# Patient Record
Sex: MALE | URBAN - METROPOLITAN AREA
[De-identification: names, ages, dates, MRNs, and addresses within clinical notes are randomized per-mention and may not be internally consistent; named-entity substitution may affect disease eponyms.]

---
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
## 2023-07-19 ENCOUNTER — APPOINTMENT (RX ONLY)
Dept: URBAN - METROPOLITAN AREA CLINIC 164 | Facility: CLINIC | Age: 23
Setting detail: DERMATOLOGY
End: 2023-07-19

## 2023-07-19 VITALS — WEIGHT: 161 LBS | HEIGHT: 67 IN

## 2023-07-19 DIAGNOSIS — L70.0 ACNE VULGARIS: ICD-10-CM | Status: INADEQUATELY CONTROLLED

## 2023-07-19 DIAGNOSIS — Z41.9 ENCOUNTER FOR PROCEDURE FOR PURPOSES OTHER THAN REMEDYING HEALTH STATE, UNSPECIFIED: ICD-10-CM

## 2023-07-19 PROCEDURE — ? COSMETIC CONSULTATION: HYDRAFACIAL

## 2023-07-19 PROCEDURE — ? COSMETIC CONSULTATION: CHEMICAL PEELS

## 2023-07-19 PROCEDURE — ? ADDITIONAL NOTES

## 2023-07-19 PROCEDURE — ? COUNSELING

## 2023-07-19 PROCEDURE — ? COSMETIC QUOTE

## 2023-07-19 PROCEDURE — ? PRODUCT LINE (ZO SKIN HEALTH)

## 2023-07-19 PROCEDURE — ? PRESCRIPTION

## 2023-07-19 PROCEDURE — ? COSMETIC CONSULTATION - MICRO-NEEDLING

## 2023-07-19 PROCEDURE — 99214 OFFICE O/P EST MOD 30 MIN: CPT

## 2023-07-19 RX ORDER — CLINDAMYCIN PHOSPHATE AND TRETINOIN 12; .25 MG/G; MG/G
GEL TOPICAL
Qty: 60 | Refills: 3 | Status: ERX | COMMUNITY
Start: 2023-07-19

## 2023-07-19 RX ADMIN — CLINDAMYCIN PHOSPHATE AND TRETINOIN: 12; .25 GEL TOPICAL at 00:00

## 2023-07-19 ASSESSMENT — LOCATION ZONE DERM: LOCATION ZONE: FACE

## 2023-07-19 ASSESSMENT — LOCATION SIMPLE DESCRIPTION DERM: LOCATION SIMPLE: LEFT CHEEK

## 2023-07-19 ASSESSMENT — LOCATION DETAILED DESCRIPTION DERM: LOCATION DETAILED: LEFT CENTRAL MALAR CHEEK

## 2023-07-19 NOTE — HPI: SECONDARY COMPLAINT
How Severe Is This Condition?: mild
Additional History: It pops up in a specific pattern once a month

## 2023-07-19 NOTE — PROCEDURE: COSMETIC CONSULTATION - MICRO-NEEDLING
Price (Use Numbers Only, No Special Characters Or $): 236 Consultation Charge $ (Use Numbers Only, No Text Please.): 779

## 2023-07-19 NOTE — PROCEDURE: COSMETIC CONSULTATION: CHEMICAL PEELS
Consultation Charge $ (Use Numbers Only, No Special Characters Or $): 533 Price (Use Numbers Only, No Special Characters Or $): 389

## 2023-07-19 NOTE — HPI: PIMPLES (ACNE)
What Type Of Note Output Would You Prefer (Optional)?: Bullet Format
How Severe Is Your Acne?: mild
Is This A New Presentation, Or A Follow-Up?: Acne
Additional Comments (Use Complete Sentences): Pt just had a consult with  and pt will get Hydrafacial done in August.

## 2023-07-19 NOTE — PROCEDURE: COUNSELING
Sarecycline Counseling: Patient advised regarding possible photosensitivity and discoloration of the teeth, skin, lips, tongue and gums.  Patient instructed to avoid sunlight, if possible.  When exposed to sunlight, patients should wear protective clothing, sunglasses, and sunscreen.  The patient was instructed to call the office immediately if the following severe adverse effects occur:  hearing changes, easy bruising/bleeding, severe headache, or vision changes.  The patient verbalized understanding of the proper use and possible adverse effects of sarecycline.  All of the patient's questions and concerns were addressed.
gaby
Azelaic Acid Counseling: Patient counseled that medicine may cause skin irritation and to avoid applying near the eyes.  In the event of skin irritation, the patient was advised to reduce the amount of the drug applied or use it less frequently.   The patient verbalized understanding of the proper use and possible adverse effects of azelaic acid.  All of the patient's questions and concerns were addressed.
Spironolactone Counseling: Patient advised regarding risks of diarrhea, abdominal pain, hyperkalemia, birth defects (for female patients), liver toxicity and renal toxicity. The patient may need blood work to monitor liver and kidney function and potassium levels while on therapy. The patient verbalized understanding of the proper use and possible adverse effects of spironolactone.  All of the patient's questions and concerns were addressed.
High Dose Vitamin A Counseling: Side effects reviewed, pt to contact office should one occur.
Tetracycline Counseling: Patient counseled regarding possible photosensitivity and increased risk for sunburn.  Patient instructed to avoid sunlight, if possible.  When exposed to sunlight, patients should wear protective clothing, sunglasses, and sunscreen.  The patient was instructed to call the office immediately if the following severe adverse effects occur:  hearing changes, easy bruising/bleeding, severe headache, or vision changes.  The patient verbalized understanding of the proper use and possible adverse effects of tetracycline.  All of the patient's questions and concerns were addressed. Patient understands to avoid pregnancy while on therapy due to potential birth defects.
Topical Retinoid counseling:  Patient advised to apply a pea-sized amount only at bedtime and wait 30 minutes after washing their face before applying.  If too drying, patient may add a non-comedogenic moisturizer. The patient verbalized understanding of the proper use and possible adverse effects of retinoids.  All of the patient's questions and concerns were addressed.
Minocycline Counseling: Patient advised regarding possible photosensitivity and discoloration of the teeth, skin, lips, tongue and gums.  Patient instructed to avoid sunlight, if possible.  When exposed to sunlight, patients should wear protective clothing, sunglasses, and sunscreen.  The patient was instructed to call the office immediately if the following severe adverse effects occur:  hearing changes, easy bruising/bleeding, severe headache, or vision changes.  The patient verbalized understanding of the proper use and possible adverse effects of minocycline.  All of the patient's questions and concerns were addressed.
Aklief counseling:  Patient advised to apply a pea-sized amount only at bedtime and wait 30 minutes after washing their face before applying.  If too drying, patient may add a non-comedogenic moisturizer.  The most commonly reported side effects including irritation, redness, scaling, dryness, stinging, burning, itching, and increased risk of sunburn.  The patient verbalized understanding of the proper use and possible adverse effects of retinoids.  All of the patient's questions and concerns were addressed.
Tazorac Counseling:  Patient advised that medication is irritating and drying.  Patient may need to apply sparingly and wash off after an hour before eventually leaving it on overnight.  The patient verbalized understanding of the proper use and possible adverse effects of tazorac.  All of the patient's questions and concerns were addressed.
Benzoyl Peroxide Counseling: Patient counseled that medicine may cause skin irritation and bleach clothing.  In the event of skin irritation, the patient was advised to reduce the amount of the drug applied or use it less frequently.   The patient verbalized understanding of the proper use and possible adverse effects of benzoyl peroxide.  All of the patient's questions and concerns were addressed.
Topical Sulfur Applications Counseling: Topical Sulfur Counseling: Patient counseled that this medication may cause skin irritation or allergic reactions.  In the event of skin irritation, the patient was advised to reduce the amount of the drug applied or use it less frequently.   The patient verbalized understanding of the proper use and possible adverse effects of topical sulfur application.  All of the patient's questions and concerns were addressed.
Winlevi Pregnancy And Lactation Text: This medication is considered safe during pregnancy and breastfeeding.
Azithromycin Counseling:  I discussed with the patient the risks of azithromycin including but not limited to GI upset, allergic reaction, drug rash, diarrhea, and yeast infections.
Bactrim Counseling:  I discussed with the patient the risks of sulfa antibiotics including but not limited to GI upset, allergic reaction, drug rash, diarrhea, dizziness, photosensitivity, and yeast infections.  Rarely, more serious reactions can occur including but not limited to aplastic anemia, agranulocytosis, methemoglobinemia, blood dyscrasias, liver or kidney failure, lung infiltrates or desquamative/blistering drug rashes.
Birth Control Pills Counseling: Birth Control Pill Counseling: I discussed with the patient the potential side effects of OCPs including but not limited to increased risk of stroke, heart attack, thrombophlebitis, deep venous thrombosis, hepatic adenomas, breast changes, GI upset, headaches, and depression.  The patient verbalized understanding of the proper use and possible adverse effects of OCPs. All of the patient's questions and concerns were addressed.
Isotretinoin Counseling: Patient should get monthly blood tests, not donate blood, not drive at night if vision affected, not share medication, and not undergo elective surgery for 6 months after tx completed. Side effects reviewed, pt to contact office should one occur.
Dapsone Counseling: I discussed with the patient the risks of dapsone including but not limited to hemolytic anemia, agranulocytosis, rashes, methemoglobinemia, kidney failure, peripheral neuropathy, headaches, GI upset, and liver toxicity.  Patients who start dapsone require monitoring including baseline LFTs and weekly CBCs for the first month, then every month thereafter.  The patient verbalized understanding of the proper use and possible adverse effects of dapsone.  All of the patient's questions and concerns were addressed.
Doxycycline Counseling:  Patient counseled regarding possible photosensitivity and increased risk for sunburn.  Patient instructed to avoid sunlight, if possible.  When exposed to sunlight, patients should wear protective clothing, sunglasses, and sunscreen.  The patient was instructed to call the office immediately if the following severe adverse effects occur:  hearing changes, easy bruising/bleeding, severe headache, or vision changes.  The patient verbalized understanding of the proper use and possible adverse effects of doxycycline.  All of the patient's questions and concerns were addressed.
Erythromycin Counseling:  I discussed with the patient the risks of erythromycin including but not limited to GI upset, allergic reaction, drug rash, diarrhea, increase in liver enzymes, and yeast infections.
Sarecycline Pregnancy And Lactation Text: This medication is Pregnancy Category D and not consider safe during pregnancy. It is also excreted in breast milk.
Isotretinoin Pregnancy And Lactation Text: This medication is Pregnancy Category X and is considered extremely dangerous during pregnancy. It is unknown if it is excreted in breast milk.
Spironolactone Pregnancy And Lactation Text: This medication can cause feminization of the male fetus and should be avoided during pregnancy. The active metabolite is also found in breast milk.
Benzoyl Peroxide Pregnancy And Lactation Text: This medication is Pregnancy Category C. It is unknown if benzoyl peroxide is excreted in breast milk.
Detail Level: Zone
Aklief Pregnancy And Lactation Text: It is unknown if this medication is safe to use during pregnancy.  It is unknown if this medication is excreted in breast milk.  Breastfeeding women should use the topical cream on the smallest area of the skin for the shortest time needed while breastfeeding.  Do not apply to nipple and areola.
Tazorac Pregnancy And Lactation Text: This medication is not safe during pregnancy. It is unknown if this medication is excreted in breast milk.
Azelaic Acid Pregnancy And Lactation Text: This medication is considered safe during pregnancy and breast feeding.
Topical Sulfur Applications Pregnancy And Lactation Text: This medication is Pregnancy Category C and has an unknown safety profile during pregnancy. It is unknown if this topical medication is excreted in breast milk.
Topical Clindamycin Pregnancy And Lactation Text: This medication is Pregnancy Category B and is considered safe during pregnancy. It is unknown if it is excreted in breast milk.
Topical Retinoid Pregnancy And Lactation Text: This medication is Pregnancy Category C. It is unknown if this medication is excreted in breast milk.
Winlevi Counseling:  I discussed with the patient the risks of topical clascoterone including but not limited to erythema, scaling, itching, and stinging. Patient voiced their understanding.
Topical Clindamycin Counseling: Patient counseled that this medication may cause skin irritation or allergic reactions.  In the event of skin irritation, the patient was advised to reduce the amount of the drug applied or use it less frequently.   The patient verbalized understanding of the proper use and possible adverse effects of clindamycin.  All of the patient's questions and concerns were addressed.
Azithromycin Pregnancy And Lactation Text: This medication is considered safe during pregnancy and is also secreted in breast milk.
Bactrim Pregnancy And Lactation Text: This medication is Pregnancy Category D and is known to cause fetal risk.  It is also excreted in breast milk.
Birth Control Pills Pregnancy And Lactation Text: This medication should be avoided if pregnant and for the first 30 days post-partum.
Dapsone Pregnancy And Lactation Text: This medication is Pregnancy Category C and is not considered safe during pregnancy or breast feeding.
Include Pregnancy/Lactation Warning?: No
High Dose Vitamin A Pregnancy And Lactation Text: High dose vitamin A therapy is contraindicated during pregnancy and breast feeding.
Doxycycline Pregnancy And Lactation Text: This medication is Pregnancy Category D and not consider safe during pregnancy. It is also excreted in breast milk but is considered safe for shorter treatment courses.
Erythromycin Pregnancy And Lactation Text: This medication is Pregnancy Category B and is considered safe during pregnancy. It is also excreted in breast milk.

## 2023-07-19 NOTE — PROCEDURE: ADDITIONAL NOTES
Additional Notes: Patient came in for a cosmetic consult. Patients concerns are acne & acne scarring. I recommended maintenance HydraFacials to keep the skin decongested & I also suggested chemical peels (Purify w/ Precision Plus). Patients lifestyle does have some conflict to the chemical peels but can be adjusted. Patient also saw Lynnette Miller proscribed him a topical spot treatment for the current breaking out. I also educated the importance of a skin care regimen AM & PM to cleanse the skin properly. I recommended ZO Gentle Cleanser & Renewal Creme, but did hand patient a sample of the gentle cleanser. \\n\\nPatient was pleased with consult & is scheduled on August 7th at 8 AM for his first HydraFacial.\\n\\nPatient left with consult literature.
Render Risk Assessment In Note?: no
Detail Level: Zone

## 2023-07-19 NOTE — PROCEDURE: COSMETIC CONSULTATION: HYDRAFACIAL
Price (Use Numbers Only, No Special Characters Or $): 515 Price (Use Numbers Only, No Special Characters Or $): 684

## 2023-07-19 NOTE — PROCEDURE: PRODUCT LINE (ZO SKIN HEALTH)
Product 36 Units: 0
Product 7 Application Directions: Apply in the PM, include the neck. Can be applied in the AM if dry.
Product 24 Price (In Dollars - Numeric Only, No Special Characters Or $): 130
Name Of Product 13: Rozatrol
Product 39 Price (In Dollars - Numeric Only, No Special Characters Or $): 0.00
Product 18 Application Directions: Apply 2x a week at night for week 0-2.\\nApply 3x a week at night for week 2-4.\\nApply 4x a week at night for week 4-6.
Product 10 Price (In Dollars - Numeric Only, No Special Characters Or $): 47
Name Of Product 16: Skin Brightening Program
Name Of Product 2: Exfoliating Polish
Name Of Product 5: Complexion Renewal Pads
Name Of Product 8: Growth Factor Serum
Product 13 Price (In Dollars - Numeric Only, No Special Characters Or $): 91
Name Of Product 19: Exfoliating Polish (travel)
Product 24 Application Directions: Apply AM & PM after cleansing & toner.
Product 2 Price (In Dollars - Numeric Only, No Special Characters Or $): 68
Product 5 Price (In Dollars - Numeric Only, No Special Characters Or $): 53
Risk Of Complication Category: No MDM
Name Of Product 22: Retinol Skin Brightener 0.1 Retinol
Product 16 Price (In Dollars - Numeric Only, No Special Characters Or $): 193
Product 19 Price (In Dollars - Numeric Only, No Special Characters Or $): 21
Product 10 Application Directions: Cleanse AM & PM 7x a week.
Name Of Product 25: Brightening Eye Creme
Allow Plan To Count Towards E/M Coding: Yes
Product 13 Application Directions: Apply AM & PM before moisturizer.
Product 2 Application Directions: Use AM or PM 2-3x a week.
Product 8 Price (In Dollars - Numeric Only, No Special Characters Or $): 155
Name Of Product 11: Oil Control Pads
Product 5 Application Directions: Apply after cleansing in the AM & PM 7x a week.
Product 16 Application Directions: Written directions were handed to patient.
Name Of Product 14: Dual Action Scrub
Product 19 Application Directions: Exfoliate 2-3x a week.
Name Of Product 3: Calming Toner
Name Of Product 6: Hydrating Cleanser
Product 22 Application Directions: Written instructions were handed to the patient.
Product 11 Price (In Dollars - Numeric Only, No Special Characters Or $): 64
Name Of Product 20: Instant Pore Refiner
Name Of Product 17: Sunscreen + Primer SPF 30
Product 14 Price (In Dollars - Numeric Only, No Special Characters Or $): 80
Product 8 Application Directions: Apply AM & PM 7x a week.
Product 25 Application Directions: Apply AM & PM on the lower lids only, after cleansing.
Product 3 Price (In Dollars - Numeric Only, No Special Characters Or $): 40
Name Of Product 23: Grown Factor Eye Serum
Product 17 Price (In Dollars - Numeric Only, No Special Characters Or $): 67
Product 20 Price (In Dollars - Numeric Only, No Special Characters Or $): 62
Name Of Product 9: Daily Power Defense
Product 14 Application Directions: Scrub in the PM 3-5x a week.
Product 3 Application Directions: Apply AM & PM 7x a week after cleansing or exfoliating (on days you exfoliate).
Product 6 Application Directions: Use AM & PM 7x a week
Name Of Product 12: Acne Control
Detail Level: Zone
Product 9 Price (In Dollars - Numeric Only, No Special Characters Or $): 160
Product 17 Application Directions: Apply AM & reapply if prolong in the sun, every 2 hours.
Name Of Product 15: Wrinkle + Texture Repair
Name Of Product 4: Renewal Creme
Name Of Product 1: Gentle Cleanser
Name Of Product 7: Recovery Creme
Product 12 Price (In Dollars - Numeric Only, No Special Characters Or $): 36
Name Of Product 18: ZO Skin Brightener 0.5 Retinol
Product 23 Application Directions: Apply to upper & lower lids after cleansing.
Product 15 Price (In Dollars - Numeric Only, No Special Characters Or $): 154
Product 4 Price (In Dollars - Numeric Only, No Special Characters Or $): 115
Name Of Product 21: Retinol Skin Brightener 0.25
Product 7 Price (In Dollars - Numeric Only, No Special Characters Or $): 120
Assigning Risk Information: Per AMA, level of risk is based upon consequences of the problem(s) addressed at the encounter when appropriately treated. Risk also includes medical decision making related to the need to initiate or forego further testing, treatment and/or hospitalization. Over the counter medication are assigned a risk level of low. Prescription medication management is assigned a risk level of moderate.
Name Of Product 24: ZO BrightAlive Skin Brightener
Product 1 Units: 1
Product 12 Application Directions: Cleanse & Apply to cystic flare up 1-3x a day.
Product 21 Price (In Dollars - Numeric Only, No Special Characters Or $): 100
Name Of Product 10: Exfoliating Cleanser
Product 15 Application Directions: Apply 2x a week at night (0-2 weeks)\\nApply 3x a week at night (2-4 weeks)\\nApply 4x a week at night (4-6 weeks)

## 2023-07-19 NOTE — PROCEDURE: COSMETIC QUOTE
Implant 10 Units: 0
Injectable  19 Price/Unit (In Dollars- Use Only Numbers And Decimals): 0.00
Face Procedure 2: SkinPen
Face Procedure 5 Units: 1
Body Procedure 2 Price/Unit (In Dollars- Use Only Numbers And Decimals): 500
Face Procedure 9 Price/Unit (In Dollars- Use Only Numbers And Decimals): 429
Face Procedure 6 Price/Unit (In Dollars- Use Only Numbers And Decimals): 389
Face Procedure 3 Price/Unit (In Dollars- Use Only Numbers And Decimals): 265
Face Procedure 10: VI Peel (Purify)
Misc Procedure 1: HydraFacial Keravive
Include Sales Tax On Facility Fees: No
Detail Level: Zone
Face Procedure 7 Price/Unit (In Dollars- Use Only Numbers And Decimals): 199
Face Procedure 4 Price/Unit (In Dollars- Use Only Numbers And Decimals): 369
Body Procedure 1 Price/Unit (In Dollars- Use Only Numbers And Decimals): 299
Face Procedure 1 Price/Unit (In Dollars- Use Only Numbers And Decimals): 150
Face Procedure 8: ZO 3 Step Peel
Face Procedure 5: Vi Peel Purify w/ Precision Plus
Body Procedure 3: HydraFacial (Back)
Face Procedure 2 Price/Unit (In Dollars- Use Only Numbers And Decimals): 329
Face Procedure 9: SkinPen with Additional Firming Serum
Face Procedure 6: VI Peel Precision Plus
Body Procedure 2: VI Peel Body (large area)
Face Procedure 3: HydraFacial (Deluxe)
Include Sales Tax On Surgeon's Fees: Yes
Face Procedure 7: HydraFacial (Signature)
Misc Procedure 1 Price/Unit (In Dollars- Use Only Numbers And Decimals): 499
Face Procedure 4: Vi Peel (Advanced)
Body Procedure 1: VI Peel Body (small area)
Face Procedure 1: Milia extraction
Face Procedure 8 Price/Unit (In Dollars- Use Only Numbers And Decimals): 319

## 2023-08-07 ENCOUNTER — APPOINTMENT (RX ONLY)
Dept: URBAN - METROPOLITAN AREA CLINIC 164 | Facility: CLINIC | Age: 23
Setting detail: DERMATOLOGY
End: 2023-08-07

## 2023-08-07 DIAGNOSIS — Z41.9 ENCOUNTER FOR PROCEDURE FOR PURPOSES OTHER THAN REMEDYING HEALTH STATE, UNSPECIFIED: ICD-10-CM

## 2023-08-07 PROCEDURE — ? ADDITIONAL NOTES

## 2023-08-07 PROCEDURE — ? DELUXE HYDRAFACIAL

## 2023-08-07 NOTE — PROCEDURE: ADDITIONAL NOTES
Render Risk Assessment In Note?: no
Additional Notes: Patient came in for a HydraFacial deluxe. Patient was pleased with treatment and is scheduled on September 5 at 9 AM for his second HydraFacial.
Detail Level: Zone

## 2023-08-07 NOTE — PROCEDURE: DELUXE HYDRAFACIAL
Price (Use Numbers Only, No Special Characters Or $): 262 Price (Use Numbers Only, No Special Characters Or $): 690

## 2023-09-05 ENCOUNTER — APPOINTMENT (RX ONLY)
Dept: URBAN - METROPOLITAN AREA CLINIC 164 | Facility: CLINIC | Age: 23
Setting detail: DERMATOLOGY
End: 2023-09-05

## 2023-09-05 DIAGNOSIS — Z41.9 ENCOUNTER FOR PROCEDURE FOR PURPOSES OTHER THAN REMEDYING HEALTH STATE, UNSPECIFIED: ICD-10-CM

## 2023-09-05 PROCEDURE — ? ADDITIONAL NOTES

## 2023-09-05 PROCEDURE — ? DELUXE HYDRAFACIAL

## 2023-09-05 NOTE — PROCEDURE: DELUXE HYDRAFACIAL
Price (Use Numbers Only, No Special Characters Or $): 130 Price (Use Numbers Only, No Special Characters Or $): 112

## 2023-09-05 NOTE — PROCEDURE: ADDITIONAL NOTES
Detail Level: Zone
Render Risk Assessment In Note?: no
Additional Notes: Patient came in for a HydraFacial Deluxe. Patient was pleased with treatment & did state he has noticed a big change, but is still having issues around the temples. \\n\\nPatient is scheduled on October 3rd at 9 AM for a HydraFacial Deluxe and on November 14th at 8:30 AM for his first chemical peel (Purify w/ Precision Plus).\\n\\nPatient also purchased ZO Gentle cleanser.

## 2023-10-06 ENCOUNTER — APPOINTMENT (RX ONLY)
Dept: URBAN - METROPOLITAN AREA CLINIC 164 | Facility: CLINIC | Age: 23
Setting detail: DERMATOLOGY
End: 2023-10-06

## 2023-10-06 DIAGNOSIS — Z41.9 ENCOUNTER FOR PROCEDURE FOR PURPOSES OTHER THAN REMEDYING HEALTH STATE, UNSPECIFIED: ICD-10-CM

## 2023-10-06 PROCEDURE — ? DELUXE HYDRAFACIAL

## 2023-10-06 PROCEDURE — ? ADDITIONAL NOTES

## 2023-10-06 NOTE — PROCEDURE: DELUXE HYDRAFACIAL
Price (Use Numbers Only, No Special Characters Or $): 435 Price (Use Numbers Only, No Special Characters Or $): 266

## 2023-10-06 NOTE — PROCEDURE: ADDITIONAL NOTES
Render Risk Assessment In Note?: no
Detail Level: Zone
Additional Notes: Patient came in for a HydraFacial Deluxe. Patient has seen significant improvement & is very pleased. Problem areas are still the temples, but have improved. Radio frequency & clarifying booster was used today. \\n\\nPatient is scheduled for a 4th HydraFacial Deluxe on 11.7.23, 1 week prior to chemical peel.

## 2023-11-07 ENCOUNTER — APPOINTMENT (RX ONLY)
Dept: URBAN - METROPOLITAN AREA CLINIC 164 | Facility: CLINIC | Age: 23
Setting detail: DERMATOLOGY
End: 2023-11-07

## 2023-11-07 DIAGNOSIS — Z41.9 ENCOUNTER FOR PROCEDURE FOR PURPOSES OTHER THAN REMEDYING HEALTH STATE, UNSPECIFIED: ICD-10-CM

## 2023-11-07 PROCEDURE — ? DELUXE HYDRAFACIAL

## 2023-11-07 PROCEDURE — ? ADDITIONAL NOTES

## 2023-11-07 NOTE — PROCEDURE: ADDITIONAL NOTES
Additional Notes: Patient came in for a HydraFacial Deluxe. Patient was pleased with treatment & is scheduled on 11.14.23 at 8:30 for a CP & 12.12.23 at 8:00 for another HydraFacial Deluxe.
Render Risk Assessment In Note?: no
Detail Level: Zone

## 2023-11-14 ENCOUNTER — APPOINTMENT (RX ONLY)
Dept: URBAN - METROPOLITAN AREA CLINIC 164 | Facility: CLINIC | Age: 23
Setting detail: DERMATOLOGY
End: 2023-11-14

## 2023-11-14 DIAGNOSIS — Z41.9 ENCOUNTER FOR PROCEDURE FOR PURPOSES OTHER THAN REMEDYING HEALTH STATE, UNSPECIFIED: ICD-10-CM

## 2023-11-14 PROCEDURE — ? VI PEEL

## 2023-11-14 PROCEDURE — ? ADDITIONAL NOTES

## 2023-11-14 NOTE — PROCEDURE: ADDITIONAL NOTES
Detail Level: Zone
Additional Notes: Patient came in for a Vi Peel Purify w/ Precision Plus. Patient was pleased with treatment & is scheduled for a HydraFacial & post op on 12.12.23 at 8am.\\n\\nPatient left with post skin care & instructions.
Render Risk Assessment In Note?: no

## 2023-11-14 NOTE — PROCEDURE: VI PEEL
Post-Care Instructions: I reviewed with the patient in detail post-care instructions. Patient should avoid sun exposure and wear sun protection.
Comments: W/ Precision Plus
Detail Level: Zone
Treatment Number: 1
Price (Use Numbers Only, No Special Characters Or $): 684
Consent: Prior to the procedure, written consent was obtained and risks were reviewed, including but not limited to: redness, peeling, blistering, pigmentary change, scarring, infection, and pain. Patient is aware multiple treatments may be necessary to achieve the desired outcome.
Chemical Peel: VI Peel Purify
Prep: The treated area was degreased with pre-peel cleanser, and vaseline was applied for protection of mucous membranes.
Post Peel Care: After the procedure, the patient was instructed not to wash the treated area for 6-8 hours or manually remove dead skin when the peeling process starts. Patient may use OTC hydrocortisone cream for itching.  Patient instructed to use the provided Retin-A wipes on the treated area on the 1st and 2nd nights.

## 2023-12-12 ENCOUNTER — APPOINTMENT (RX ONLY)
Dept: URBAN - METROPOLITAN AREA CLINIC 164 | Facility: CLINIC | Age: 23
Setting detail: DERMATOLOGY
End: 2023-12-12

## 2023-12-12 DIAGNOSIS — Z41.9 ENCOUNTER FOR PROCEDURE FOR PURPOSES OTHER THAN REMEDYING HEALTH STATE, UNSPECIFIED: ICD-10-CM

## 2023-12-12 PROCEDURE — ? VI PEEL

## 2023-12-12 PROCEDURE — ? ADDITIONAL NOTES

## 2023-12-12 NOTE — PROCEDURE: ADDITIONAL NOTES
Additional Notes: Patient came in for a HydraFacial Deluxe & post op. We then decided to treat the skin with a 2nd VI Peel (Purify w/ Precision plus). \\n\\nPatient was pleased with treatment & is scheduled on 1.23.24 at 10:30 am for a HydraFacial Deluxe.
Render Risk Assessment In Note?: no
Detail Level: Zone

## 2023-12-12 NOTE — PROCEDURE: VI PEEL
Prep: The treated area was degreased with pre-peel cleanser, and vaseline was applied for protection of mucous membranes.
Treatment Number: 2
Consent: Prior to the procedure, written consent was obtained and risks were reviewed, including but not limited to: redness, peeling, blistering, pigmentary change, scarring, infection, and pain. Patient is aware multiple treatments may be necessary to achieve the desired outcome.
Detail Level: Zone
Post Peel Care: After the procedure, the patient was instructed not to wash the treated area for 6-8 hours or manually remove dead skin when the peeling process starts. Patient may use OTC hydrocortisone cream for itching.  Patient instructed to use the provided Retin-A wipes on the treated area on the 1st and 2nd nights.
Chemical Peel: VI Peel Purify
Post-Care Instructions: I reviewed with the patient in detail post-care instructions. Patient should avoid sun exposure and wear sun protection.
Price (Use Numbers Only, No Special Characters Or $): 389
Comments: Purify w/ Precision Plus

## 2024-02-13 ENCOUNTER — APPOINTMENT (RX ONLY)
Dept: URBAN - METROPOLITAN AREA CLINIC 164 | Facility: CLINIC | Age: 24
Setting detail: DERMATOLOGY
End: 2024-02-13

## 2024-02-13 DIAGNOSIS — Z41.9 ENCOUNTER FOR PROCEDURE FOR PURPOSES OTHER THAN REMEDYING HEALTH STATE, UNSPECIFIED: ICD-10-CM

## 2024-02-13 PROCEDURE — ? PRODUCT LINE (ZO SKIN HEALTH)

## 2024-02-13 PROCEDURE — ? PRODUCT LINE (OBAGI)

## 2024-02-13 PROCEDURE — ? ADDITIONAL NOTES

## 2024-02-13 PROCEDURE — ? DELUXE HYDRAFACIAL

## 2024-02-13 NOTE — PROCEDURE: ADDITIONAL NOTES
Render Risk Assessment In Note?: no
Detail Level: Zone
Additional Notes: Patient came in for a HydraFacial Deluxe. Patient also replenished ZO Exfoliating Cleanser & Obagi Hydrate Light. Patient was pleased with treatment & is scheduled for his next treatment.

## 2024-02-13 NOTE — PROCEDURE: DELUXE HYDRAFACIAL
Number Of Passes: 0
Solution Override
Solution: GlySal 30%
Price (Use Numbers Only, No Special Characters Or $): 607
Consent: Written consent obtained, risks reviewed including but not limited to crusting, scabbing, blistering, scarring, darker or lighter pigmentary change, bruising, and/or incomplete response.
Procedure: Boost
Tip: Hydropeel Tip, Teal
Post-Care Instructions: I reviewed with the patient in detail post-care instructions. Patient should stay away from the sun and wear sun protection until treated areas are fully healed.
Tip: Hydropeel Tip, Clear
Solution Override: Obagi SunShield Matte SPF 50
Procedure: Extraction
Tip: Hydropeel Tip, Orange Aggression
Indication: acne
Procedure: Exfoliation
Solution Override: ZO Renewal Creme
Solution: Beta-HD
Procedure: Extend and Protect
Location: face
Tip Override
Procedure: Peel
Solution: Activ-4
Procedure: Fusion

## 2024-02-13 NOTE — PROCEDURE: PRODUCT LINE (ZO SKIN HEALTH)
Product 10 Price (In Dollars - Numeric Only, No Special Characters Or $): 49
Product 13 Price (In Dollars - Numeric Only, No Special Characters Or $): 91
Name Of Product 29: Balancing Emulsion Cleanser
Product 18 Price (In Dollars - Numeric Only, No Special Characters Or $): 115
Send Charges To Patient Encounter: Yes
Product 41 Price (In Dollars - Numeric Only, No Special Characters Or $): 0.00
Product 2 Application Directions: Use AM or PM 2-3x a week.
Product 26 Price (In Dollars - Numeric Only, No Special Characters Or $): 120
Product 5 Price (In Dollars - Numeric Only, No Special Characters Or $): 53
Product 20 Units: 0
Name Of Product 3: Calming Toner
Name Of Product 24: ZO BrightAlive Skin Brightener
Name Of Product 16: Skin Brightening Program
Product 20 Application Directions: Apply AM & PM 7x a week.
Product 23 Price (In Dollars - Numeric Only, No Special Characters Or $): 130
Product 10 Units: 1
Product 29 Price (In Dollars - Numeric Only, No Special Characters Or $): 52
Name Of Product 21: Retinol Skin Brightener 0.25
Product 26 Application Directions: Apply after cleaning and toning
Product 10 Application Directions: Cleanse AM & PM 7x a week.
Product 23 Application Directions: Apply to upper & lower lids after cleansing.
Product 13 Application Directions: Apply AM & PM before moisturizer.
Product 24 Price (In Dollars - Numeric Only, No Special Characters Or $): 145
Product 3 Price (In Dollars - Numeric Only, No Special Characters Or $): 40
Product 5 Application Directions: Apply after cleansing in the AM & PM 7x a week.
Product 16 Price (In Dollars - Numeric Only, No Special Characters Or $): 193
Name Of Product 8: Growth Factor Serum
Product 21 Price (In Dollars - Numeric Only, No Special Characters Or $): 100
Name Of Product 27: Exfoliation Accelerator
Product 6 Price (In Dollars - Numeric Only, No Special Characters Or $): 47
Name Of Product 11: Oil Control Pads
Product 18 Application Directions: Apply 2x a week at night for week 0-2.\\nApply 3x a week at night for week 2-4.\\nApply 4x a week at night for week 4-6.
Name Of Product 1: Gentle Cleanser
Name Of Product 6: Hydrating Cleanser
Product 29 Application Directions: Cleanse am & pm daily
Product 8 Price (In Dollars - Numeric Only, No Special Characters Or $): 168
Name Of Product 14: Dual Action Scrub
Product 14 Price (In Dollars - Numeric Only, No Special Characters Or $): 80
Product 11 Price (In Dollars - Numeric Only, No Special Characters Or $): 64
Product 3 Application Directions: Apply AM & PM 7x a week after cleansing or exfoliating (on days you exfoliate).
Product 24 Application Directions: Apply AM & PM after cleansing & toner.
Product 27 Price (In Dollars - Numeric Only, No Special Characters Or $): 72
Product 16 Application Directions: Written directions were handed to patient.
Name Of Product 19: Exfoliating Polish (travel)
Product 19 Price (In Dollars - Numeric Only, No Special Characters Or $): 21
Name Of Product 30: BrightAlive
Product 27 Application Directions: Apply AM, 7x after cleansing.
Name Of Product 9: Daily Power Defense
Detail Level: Zone
Name Of Product 12: Acne Control
Name Of Product 4: Renewal Creme
Name Of Product 17: Sunscreen + Primer SPF 30
Name Of Product 25: Brightening Eye Creme
Product 14 Application Directions: Scrub in the PM 3-5x a week.
Product 6 Application Directions: Use AM & PM 7x a week
Name Of Product 22: Retinol Skin Brightener 0.1 Retinol
Product 9 Price (In Dollars - Numeric Only, No Special Characters Or $): 173
Product 19 Application Directions: Exfoliate 2-3x a week.
Name Of Product 28: Illuminating AOX Serum
Product 12 Price (In Dollars - Numeric Only, No Special Characters Or $): 36
Product 17 Price (In Dollars - Numeric Only, No Special Characters Or $): 67
Name Of Product 2: Exfoliating Polish
Product 30 Application Directions: Apply AM & PM after Daily Power Defense
Name Of Product 7: Recovery Creme
Assigning Risk Information: Per AMA, level of risk is based upon consequences of the problem(s) addressed at the encounter when appropriately treated. Risk also includes medical decision making related to the need to initiate or forego further testing, treatment and/or hospitalization. Over the counter medication are assigned a risk level of low. Prescription medication management is assigned a risk level of moderate.
Name Of Product 15: Wrinkle + Texture Repair
Name Of Product 20: Instant Pore Refiner
Product 25 Application Directions: Apply AM & PM on the lower lids only, after cleansing.
Product 28 Price (In Dollars - Numeric Only, No Special Characters Or $): 165
Risk Of Complication Category: No MDM
Product 22 Application Directions: Written instructions were handed to the patient.
Product 2 Price (In Dollars - Numeric Only, No Special Characters Or $): 68
Product 17 Application Directions: Apply AM & reapply if prolong in the sun, every 2 hours.
Product 7 Price (In Dollars - Numeric Only, No Special Characters Or $): 125
Product 15 Price (In Dollars - Numeric Only, No Special Characters Or $): 159
Product 12 Application Directions: Cleanse & Apply to cystic flare up 1-3x a day.
Product 28 Application Directions: Apply AM before SPF.
Product 7 Application Directions: Apply in the PM, include the neck. Can be applied in the AM if dry.
Name Of Product 10: Exfoliating Cleanser
Name Of Product 5: Complexion Renewal Pads
Name Of Product 26: Hydrating Creme
Product 20 Price (In Dollars - Numeric Only, No Special Characters Or $): 62
Name Of Product 23: Grown Factor Eye Serum
Product 15 Application Directions: Apply 2x a week at night (0-2 weeks)\\nApply 3x a week at night (2-4 weeks)\\nApply 4x a week at night (4-6 weeks)
Name Of Product 18: ZO Skin Brightener 0.5 Retinol
Name Of Product 13: Rozatrol

## 2024-02-13 NOTE — PROCEDURE: PRODUCT LINE (OBAGI)
Product 29 Units: 0
Name Of Product 22: Nu-Derm Gentle Cleanser
Product 3 Price (In Dollars - Numeric Only, No Special Characters Or $): 98
Product 8 Price (In Dollars - Numeric Only, No Special Characters Or $): 54
Product 29 Application Directions: Apply to cleansed eye area am & pm daily
Product 40 Price (In Dollars - Numeric Only, No Special Characters Or $): 0.00
Name Of Product 1: 15% Vitamin C Serum
Product 13 Application Directions: Written instructions were handed to patient.
Product 16 Price (In Dollars - Numeric Only, No Special Characters Or $): 94
Name Of Product 27: Obagi Professional-C Serum 10%
Name Of Product 6: Nu-Cil Eyelash Serum
Name Of Product 19: ELASTIderm Eye Cream
Product 24 Application Directions: Apply to cleansed skin every night.
Product 8 Application Directions: Apply after cleansing in the AM. Reapply every two hours if prolong in the sun.
Product 16 Application Directions: Apply in the AM before moisturizer.
Product 11 Application Directions: Apply AM & PM after cleansing.
Product 9 Price (In Dollars - Numeric Only, No Special Characters Or $): 42
Product 22 Price (In Dollars - Numeric Only, No Special Characters Or $): 44
Product 14 Price (In Dollars - Numeric Only, No Special Characters Or $): 198
Name Of Product 30: Obagi Physical Defense SPF 40
Product 1 Price (In Dollars - Numeric Only, No Special Characters Or $): 115.50
Name Of Product 14: ELASTIderm Facial Serum
Detail Level: Zone
Product 19 Price (In Dollars - Numeric Only, No Special Characters Or $): 118
Product 6 Price (In Dollars - Numeric Only, No Special Characters Or $): 120
Product 3 Application Directions: Apply AM & PM 7x a week.
Name Of Product 25: Obagi C-Clarifying Serum
Product 25 Price (In Dollars - Numeric Only, No Special Characters Or $): 148
Name Of Product 9: Obagi 360 Exfoliating Cleanser
Name Of Product 4: Hydrate Luxe
Name Of Product 12: Obagi SunShield SPF 50 Matte
Product 27 Application Directions: Apply AM before moisturizer
Product 9 Application Directions: Cleanse AM & PM 7x a week.
Product 30 Price (In Dollars - Numeric Only, No Special Characters Or $): 60
Product 22 Application Directions: Cleanse AM & PM, 7x a week.
Name Of Product 17: SunShield Broad Spectrum SPF 50 Tint (Warm)
Product 1 Application Directions: Apply AM 7x a week.
Name Of Product 23: Tretinoin 0.25
Assigning Risk Information: Per AMA, level of risk is based upon consequences of the problem(s) addressed at the encounter when appropriately treated. Risk also includes medical decision making related to the need to initiate or forego further testing, treatment and/or hospitalization. Over the counter medication are assigned a risk level of low. Prescription medication management is assigned a risk level of moderate.
Product 4 Price (In Dollars - Numeric Only, No Special Characters Or $): 75
Name Of Product 20: CLENZIderm Daily Care Foaming Cleanser
Product 6 Application Directions: On cleansed skin apply to upper lash line nightly.
Product 19 Application Directions: Apply to cleansed skin AM & PM.
Risk Of Complication Category: No MDM
Name Of Product 7: Obagi Toner
Name Of Product 28: Obagi Cleansing Wipes
Product 25 Application Directions: Apply after cleansing.
Name Of Product 10: Obagi Rebalance
Product 10 Price (In Dollars - Numeric Only, No Special Characters Or $): 110
Product 17 Application Directions: Apply in the AM & reapply if prolong in the sun.
Name Of Product 2: ELASTIderm Eye Serum
Product 23 Price (In Dollars - Numeric Only, No Special Characters Or $): 85
Name Of Product 31: Hydrate Light
Product 7 Price (In Dollars - Numeric Only, No Special Characters Or $): 43
Product 28 Price (In Dollars - Numeric Only, No Special Characters Or $): 27
Allow Plan To Count Towards E/M Coding: Yes
Name Of Product 15: Tretinoin 0.1%
Product 4 Application Directions: Apply AM & PM
Name Of Product 5: Hydrate Moisturizer
Product 2 Price (In Dollars - Numeric Only, No Special Characters Or $): 107
Name Of Product 21: CLENZIderm Pore Therapy
Name Of Product 13: Obagi Nu-Derm Normal to Dry kit
Product 10 Application Directions: Apply AM & PM as a moisturizer.
Product 15 Price (In Dollars - Numeric Only, No Special Characters Or $): 79
Product 23 Application Directions: Apply a pea size amount to finger & apply externally & bring in internally on the face every other night.
Product 31 Price (In Dollars - Numeric Only, No Special Characters Or $): 58
Name Of Product 18: Tretinoin 0.5
Name Of Product 26: Nu-Derm Foaming Cleanser
Product 15 Application Directions: Apply PM after cleansing & serums before moisturizer.
Product 5 Price (In Dollars - Numeric Only, No Special Characters Or $): 55
Product 2 Application Directions: Apply eye serum AM and eye cream in the PM.
Product 13 Price (In Dollars - Numeric Only, No Special Characters Or $): 500
Name Of Product 11: ELASTIderm Neck & Decollete Concentrate
Product 31 Application Directions: Apply AM & PM after cleansing
Product 18 Price (In Dollars - Numeric Only, No Special Characters Or $): 92
Product 31 Units: 1
Name Of Product 24: Nu-Cil Eyebrow Boosting Serum
Product 24 Price (In Dollars - Numeric Only, No Special Characters Or $): 145
Name Of Product 3: Daily Hydro-Drops
Product 18 Application Directions: Apply 2-3x a week only in the PM.
Product 21 Application Directions: Apply as a toner after cleansing or exfoliating
Name Of Product 8: Obagi Sun Shield Tint SPF (Warm)
Product 11 Price (In Dollars - Numeric Only, No Special Characters Or $): 250
Product 26 Application Directions: Cleanse AM & double cleanse PM
Name Of Product 16: Professional-C Serum 10%

## 2024-02-20 ENCOUNTER — APPOINTMENT (RX ONLY)
Dept: URBAN - METROPOLITAN AREA CLINIC 164 | Facility: CLINIC | Age: 24
Setting detail: DERMATOLOGY
End: 2024-02-20

## 2024-02-20 DIAGNOSIS — L70.0 ACNE VULGARIS: ICD-10-CM

## 2024-02-20 DIAGNOSIS — L21.8 OTHER SEBORRHEIC DERMATITIS: ICD-10-CM

## 2024-02-20 PROCEDURE — ? COUNSELING

## 2024-02-20 PROCEDURE — ? TREATMENT REGIMEN

## 2024-02-20 PROCEDURE — ? PRESCRIPTION

## 2024-02-20 PROCEDURE — 99214 OFFICE O/P EST MOD 30 MIN: CPT

## 2024-02-20 RX ORDER — CLINDAMYCIN PHOSPHATE/BENZOYL PEROXIDE/ADAPALENE 1.5; 31; 12 MG/G; MG/G; MG/G
GEL TOPICAL
Qty: 50 | Refills: 0 | Status: ERX | COMMUNITY
Start: 2024-02-20

## 2024-02-20 RX ORDER — MOMETASONE FUROATE 1 MG/ML
SOLUTION TOPICAL
Qty: 60 | Refills: 2 | Status: ERX | COMMUNITY
Start: 2024-02-20

## 2024-02-20 RX ORDER — KETOCONAZOLE 20 MG/ML
SHAMPOO, SUSPENSION TOPICAL
Qty: 120 | Refills: 4 | Status: ERX | COMMUNITY
Start: 2024-02-20

## 2024-02-20 RX ADMIN — CLINDAMYCIN PHOSPHATE/BENZOYL PEROXIDE/ADAPALENE: 1.5; 31; 12 GEL TOPICAL at 00:00

## 2024-02-20 RX ADMIN — KETOCONAZOLE: 20 SHAMPOO, SUSPENSION TOPICAL at 00:00

## 2024-02-20 RX ADMIN — MOMETASONE FUROATE: 1 SOLUTION TOPICAL at 00:00

## 2024-02-20 ASSESSMENT — LOCATION ZONE DERM: LOCATION ZONE: FACE

## 2024-02-20 ASSESSMENT — LOCATION SIMPLE DESCRIPTION DERM: LOCATION SIMPLE: LEFT CHEEK

## 2024-02-20 ASSESSMENT — LOCATION DETAILED DESCRIPTION DERM: LOCATION DETAILED: LEFT CENTRAL MALAR CHEEK

## 2024-02-20 NOTE — PROCEDURE: COUNSELING
Sarecycline Counseling: Patient advised regarding possible photosensitivity and discoloration of the teeth, skin, lips, tongue and gums.  Patient instructed to avoid sunlight, if possible.  When exposed to sunlight, patients should wear protective clothing, sunglasses, and sunscreen.  The patient was instructed to call the office immediately if the following severe adverse effects occur:  hearing changes, easy bruising/bleeding, severe headache, or vision changes.  The patient verbalized understanding of the proper use and possible adverse effects of sarecycline.  All of the patient's questions and concerns were addressed.
Azelaic Acid Counseling: Patient counseled that medicine may cause skin irritation and to avoid applying near the eyes.  In the event of skin irritation, the patient was advised to reduce the amount of the drug applied or use it less frequently.   The patient verbalized understanding of the proper use and possible adverse effects of azelaic acid.  All of the patient's questions and concerns were addressed.
Spironolactone Counseling: Patient advised regarding risks of diarrhea, abdominal pain, hyperkalemia, birth defects (for female patients), liver toxicity and renal toxicity. The patient may need blood work to monitor liver and kidney function and potassium levels while on therapy. The patient verbalized understanding of the proper use and possible adverse effects of spironolactone.  All of the patient's questions and concerns were addressed.
High Dose Vitamin A Counseling: Side effects reviewed, pt to contact office should one occur.
Tetracycline Counseling: Patient counseled regarding possible photosensitivity and increased risk for sunburn.  Patient instructed to avoid sunlight, if possible.  When exposed to sunlight, patients should wear protective clothing, sunglasses, and sunscreen.  The patient was instructed to call the office immediately if the following severe adverse effects occur:  hearing changes, easy bruising/bleeding, severe headache, or vision changes.  The patient verbalized understanding of the proper use and possible adverse effects of tetracycline.  All of the patient's questions and concerns were addressed. Patient understands to avoid pregnancy while on therapy due to potential birth defects.
Topical Retinoid counseling:  Patient advised to apply a pea-sized amount only at bedtime and wait 30 minutes after washing their face before applying.  If too drying, patient may add a non-comedogenic moisturizer. The patient verbalized understanding of the proper use and possible adverse effects of retinoids.  All of the patient's questions and concerns were addressed.
Minocycline Counseling: Patient advised regarding possible photosensitivity and discoloration of the teeth, skin, lips, tongue and gums.  Patient instructed to avoid sunlight, if possible.  When exposed to sunlight, patients should wear protective clothing, sunglasses, and sunscreen.  The patient was instructed to call the office immediately if the following severe adverse effects occur:  hearing changes, easy bruising/bleeding, severe headache, or vision changes.  The patient verbalized understanding of the proper use and possible adverse effects of minocycline.  All of the patient's questions and concerns were addressed.
Aklief counseling:  Patient advised to apply a pea-sized amount only at bedtime and wait 30 minutes after washing their face before applying.  If too drying, patient may add a non-comedogenic moisturizer.  The most commonly reported side effects including irritation, redness, scaling, dryness, stinging, burning, itching, and increased risk of sunburn.  The patient verbalized understanding of the proper use and possible adverse effects of retinoids.  All of the patient's questions and concerns were addressed.
Tazorac Counseling:  Patient advised that medication is irritating and drying.  Patient may need to apply sparingly and wash off after an hour before eventually leaving it on overnight.  The patient verbalized understanding of the proper use and possible adverse effects of tazorac.  All of the patient's questions and concerns were addressed.
Benzoyl Peroxide Counseling: Patient counseled that medicine may cause skin irritation and bleach clothing.  In the event of skin irritation, the patient was advised to reduce the amount of the drug applied or use it less frequently.   The patient verbalized understanding of the proper use and possible adverse effects of benzoyl peroxide.  All of the patient's questions and concerns were addressed.
Topical Sulfur Applications Counseling: Topical Sulfur Counseling: Patient counseled that this medication may cause skin irritation or allergic reactions.  In the event of skin irritation, the patient was advised to reduce the amount of the drug applied or use it less frequently.   The patient verbalized understanding of the proper use and possible adverse effects of topical sulfur application.  All of the patient's questions and concerns were addressed.
Winlevi Pregnancy And Lactation Text: This medication is considered safe during pregnancy and breastfeeding.
Azithromycin Counseling:  I discussed with the patient the risks of azithromycin including but not limited to GI upset, allergic reaction, drug rash, diarrhea, and yeast infections.
Bactrim Counseling:  I discussed with the patient the risks of sulfa antibiotics including but not limited to GI upset, allergic reaction, drug rash, diarrhea, dizziness, photosensitivity, and yeast infections.  Rarely, more serious reactions can occur including but not limited to aplastic anemia, agranulocytosis, methemoglobinemia, blood dyscrasias, liver or kidney failure, lung infiltrates or desquamative/blistering drug rashes.
Birth Control Pills Counseling: Birth Control Pill Counseling: I discussed with the patient the potential side effects of OCPs including but not limited to increased risk of stroke, heart attack, thrombophlebitis, deep venous thrombosis, hepatic adenomas, breast changes, GI upset, headaches, and depression.  The patient verbalized understanding of the proper use and possible adverse effects of OCPs. All of the patient's questions and concerns were addressed.
Isotretinoin Counseling: Patient should get monthly blood tests, not donate blood, not drive at night if vision affected, not share medication, and not undergo elective surgery for 6 months after tx completed. Side effects reviewed, pt to contact office should one occur.
Dapsone Counseling: I discussed with the patient the risks of dapsone including but not limited to hemolytic anemia, agranulocytosis, rashes, methemoglobinemia, kidney failure, peripheral neuropathy, headaches, GI upset, and liver toxicity.  Patients who start dapsone require monitoring including baseline LFTs and weekly CBCs for the first month, then every month thereafter.  The patient verbalized understanding of the proper use and possible adverse effects of dapsone.  All of the patient's questions and concerns were addressed.
Doxycycline Counseling:  Patient counseled regarding possible photosensitivity and increased risk for sunburn.  Patient instructed to avoid sunlight, if possible.  When exposed to sunlight, patients should wear protective clothing, sunglasses, and sunscreen.  The patient was instructed to call the office immediately if the following severe adverse effects occur:  hearing changes, easy bruising/bleeding, severe headache, or vision changes.  The patient verbalized understanding of the proper use and possible adverse effects of doxycycline.  All of the patient's questions and concerns were addressed.
Erythromycin Counseling:  I discussed with the patient the risks of erythromycin including but not limited to GI upset, allergic reaction, drug rash, diarrhea, increase in liver enzymes, and yeast infections.
Sarecycline Pregnancy And Lactation Text: This medication is Pregnancy Category D and not consider safe during pregnancy. It is also excreted in breast milk.
Isotretinoin Pregnancy And Lactation Text: This medication is Pregnancy Category X and is considered extremely dangerous during pregnancy. It is unknown if it is excreted in breast milk.
Spironolactone Pregnancy And Lactation Text: This medication can cause feminization of the male fetus and should be avoided during pregnancy. The active metabolite is also found in breast milk.
Benzoyl Peroxide Pregnancy And Lactation Text: This medication is Pregnancy Category C. It is unknown if benzoyl peroxide is excreted in breast milk.
Detail Level: Zone
Aklief Pregnancy And Lactation Text: It is unknown if this medication is safe to use during pregnancy.  It is unknown if this medication is excreted in breast milk.  Breastfeeding women should use the topical cream on the smallest area of the skin for the shortest time needed while breastfeeding.  Do not apply to nipple and areola.
Tazorac Pregnancy And Lactation Text: This medication is not safe during pregnancy. It is unknown if this medication is excreted in breast milk.
Azelaic Acid Pregnancy And Lactation Text: This medication is considered safe during pregnancy and breast feeding.
Topical Sulfur Applications Pregnancy And Lactation Text: This medication is Pregnancy Category C and has an unknown safety profile during pregnancy. It is unknown if this topical medication is excreted in breast milk.
Topical Clindamycin Pregnancy And Lactation Text: This medication is Pregnancy Category B and is considered safe during pregnancy. It is unknown if it is excreted in breast milk.
Topical Retinoid Pregnancy And Lactation Text: This medication is Pregnancy Category C. It is unknown if this medication is excreted in breast milk.
Winlevi Counseling:  I discussed with the patient the risks of topical clascoterone including but not limited to erythema, scaling, itching, and stinging. Patient voiced their understanding.
Topical Clindamycin Counseling: Patient counseled that this medication may cause skin irritation or allergic reactions.  In the event of skin irritation, the patient was advised to reduce the amount of the drug applied or use it less frequently.   The patient verbalized understanding of the proper use and possible adverse effects of clindamycin.  All of the patient's questions and concerns were addressed.
Azithromycin Pregnancy And Lactation Text: This medication is considered safe during pregnancy and is also secreted in breast milk.
Bactrim Pregnancy And Lactation Text: This medication is Pregnancy Category D and is known to cause fetal risk.  It is also excreted in breast milk.
Birth Control Pills Pregnancy And Lactation Text: This medication should be avoided if pregnant and for the first 30 days post-partum.
Dapsone Pregnancy And Lactation Text: This medication is Pregnancy Category C and is not considered safe during pregnancy or breast feeding.
Include Pregnancy/Lactation Warning?: No
High Dose Vitamin A Pregnancy And Lactation Text: High dose vitamin A therapy is contraindicated during pregnancy and breast feeding.
Doxycycline Pregnancy And Lactation Text: This medication is Pregnancy Category D and not consider safe during pregnancy. It is also excreted in breast milk but is considered safe for shorter treatment courses.
Erythromycin Pregnancy And Lactation Text: This medication is Pregnancy Category B and is considered safe during pregnancy. It is also excreted in breast milk.

## 2024-04-16 ENCOUNTER — APPOINTMENT (RX ONLY)
Dept: URBAN - METROPOLITAN AREA CLINIC 164 | Facility: CLINIC | Age: 24
Setting detail: DERMATOLOGY
End: 2024-04-16

## 2024-04-16 VITALS — WEIGHT: 160 LBS | HEIGHT: 67 IN

## 2024-04-16 DIAGNOSIS — L74.51 PRIMARY FOCAL HYPERHIDROSIS: ICD-10-CM | Status: INADEQUATELY CONTROLLED

## 2024-04-16 DIAGNOSIS — L70.0 ACNE VULGARIS: ICD-10-CM | Status: IMPROVED

## 2024-04-16 DIAGNOSIS — L21.8 OTHER SEBORRHEIC DERMATITIS: ICD-10-CM | Status: WELL CONTROLLED

## 2024-04-16 PROBLEM — L74.510 PRIMARY FOCAL HYPERHIDROSIS, AXILLA: Status: ACTIVE | Noted: 2024-04-16

## 2024-04-16 PROCEDURE — ? PRESCRIPTION

## 2024-04-16 PROCEDURE — ? COUNSELING

## 2024-04-16 PROCEDURE — ? PRESCRIPTION MEDICATION MANAGEMENT

## 2024-04-16 PROCEDURE — 99214 OFFICE O/P EST MOD 30 MIN: CPT

## 2024-04-16 RX ORDER — GLYCOPYRRONIUM 2.4 G/100G
CLOTH TOPICAL QHS
Qty: 30 | Refills: 10 | Status: ERX | COMMUNITY
Start: 2024-04-16

## 2024-04-16 RX ADMIN — GLYCOPYRRONIUM: 2.4 CLOTH TOPICAL at 00:00

## 2024-04-16 ASSESSMENT — LOCATION DETAILED DESCRIPTION DERM
LOCATION DETAILED: LEFT AXILLARY VAULT
LOCATION DETAILED: LEFT INFERIOR CENTRAL MALAR CHEEK
LOCATION DETAILED: RIGHT SUPERIOR PARIETAL SCALP
LOCATION DETAILED: RIGHT AXILLARY VAULT

## 2024-04-16 ASSESSMENT — LOCATION SIMPLE DESCRIPTION DERM
LOCATION SIMPLE: LEFT CHEEK
LOCATION SIMPLE: RIGHT AXILLARY VAULT
LOCATION SIMPLE: LEFT AXILLARY VAULT
LOCATION SIMPLE: SCALP

## 2024-04-16 ASSESSMENT — LOCATION ZONE DERM
LOCATION ZONE: FACE
LOCATION ZONE: AXILLAE
LOCATION ZONE: SCALP

## 2024-04-16 NOTE — PROCEDURE: COUNSELING
Azithromycin Pregnancy And Lactation Text: This medication is considered safe during pregnancy and is also secreted in breast milk.
Topical Sulfur Applications Counseling: Topical Sulfur Counseling: Patient counseled that this medication may cause skin irritation or allergic reactions.  In the event of skin irritation, the patient was advised to reduce the amount of the drug applied or use it less frequently.   The patient verbalized understanding of the proper use and possible adverse effects of topical sulfur application.  All of the patient's questions and concerns were addressed.
Winlevi Counseling:  I discussed with the patient the risks of topical clascoterone including but not limited to erythema, scaling, itching, and stinging. Patient voiced their understanding.
High Dose Vitamin A Pregnancy And Lactation Text: High dose vitamin A therapy is contraindicated during pregnancy and breast feeding.
Topical Retinoid counseling:  Patient advised to apply a pea-sized amount only at bedtime and wait 30 minutes after washing their face before applying.  If too drying, patient may add a non-comedogenic moisturizer. The patient verbalized understanding of the proper use and possible adverse effects of retinoids.  All of the patient's questions and concerns were addressed.
Doxycycline Pregnancy And Lactation Text: This medication is Pregnancy Category D and not consider safe during pregnancy. It is also excreted in breast milk but is considered safe for shorter treatment courses.
Bactrim Pregnancy And Lactation Text: This medication is Pregnancy Category D and is known to cause fetal risk.  It is also excreted in breast milk.
Erythromycin Pregnancy And Lactation Text: This medication is Pregnancy Category B and is considered safe during pregnancy. It is also excreted in breast milk.
Dapsone Pregnancy And Lactation Text: This medication is Pregnancy Category C and is not considered safe during pregnancy or breast feeding.
Topical Clindamycin Counseling: Patient counseled that this medication may cause skin irritation or allergic reactions.  In the event of skin irritation, the patient was advised to reduce the amount of the drug applied or use it less frequently.   The patient verbalized understanding of the proper use and possible adverse effects of clindamycin.  All of the patient's questions and concerns were addressed.
Tazorac Counseling:  Patient advised that medication is irritating and drying.  Patient may need to apply sparingly and wash off after an hour before eventually leaving it on overnight.  The patient verbalized understanding of the proper use and possible adverse effects of tazorac.  All of the patient's questions and concerns were addressed.
Birth Control Pills Pregnancy And Lactation Text: This medication should be avoided if pregnant and for the first 30 days post-partum.
Aklief counseling:  Patient advised to apply a pea-sized amount only at bedtime and wait 30 minutes after washing their face before applying.  If too drying, patient may add a non-comedogenic moisturizer.  The most commonly reported side effects including irritation, redness, scaling, dryness, stinging, burning, itching, and increased risk of sunburn.  The patient verbalized understanding of the proper use and possible adverse effects of retinoids.  All of the patient's questions and concerns were addressed.
Minocycline Counseling: Patient advised regarding possible photosensitivity and discoloration of the teeth, skin, lips, tongue and gums.  Patient instructed to avoid sunlight, if possible.  When exposed to sunlight, patients should wear protective clothing, sunglasses, and sunscreen.  The patient was instructed to call the office immediately if the following severe adverse effects occur:  hearing changes, easy bruising/bleeding, severe headache, or vision changes.  The patient verbalized understanding of the proper use and possible adverse effects of minocycline.  All of the patient's questions and concerns were addressed.
Spironolactone Counseling: Patient advised regarding risks of diarrhea, abdominal pain, hyperkalemia, birth defects (for female patients), liver toxicity and renal toxicity. The patient may need blood work to monitor liver and kidney function and potassium levels while on therapy. The patient verbalized understanding of the proper use and possible adverse effects of spironolactone.  All of the patient's questions and concerns were addressed.
Tetracycline Counseling: Patient counseled regarding possible photosensitivity and increased risk for sunburn.  Patient instructed to avoid sunlight, if possible.  When exposed to sunlight, patients should wear protective clothing, sunglasses, and sunscreen.  The patient was instructed to call the office immediately if the following severe adverse effects occur:  hearing changes, easy bruising/bleeding, severe headache, or vision changes.  The patient verbalized understanding of the proper use and possible adverse effects of tetracycline.  All of the patient's questions and concerns were addressed. Patient understands to avoid pregnancy while on therapy due to potential birth defects.
Benzoyl Peroxide Counseling: Patient counseled that medicine may cause skin irritation and bleach clothing.  In the event of skin irritation, the patient was advised to reduce the amount of the drug applied or use it less frequently.   The patient verbalized understanding of the proper use and possible adverse effects of benzoyl peroxide.  All of the patient's questions and concerns were addressed.
Sarecycline Counseling: Patient advised regarding possible photosensitivity and discoloration of the teeth, skin, lips, tongue and gums.  Patient instructed to avoid sunlight, if possible.  When exposed to sunlight, patients should wear protective clothing, sunglasses, and sunscreen.  The patient was instructed to call the office immediately if the following severe adverse effects occur:  hearing changes, easy bruising/bleeding, severe headache, or vision changes.  The patient verbalized understanding of the proper use and possible adverse effects of sarecycline.  All of the patient's questions and concerns were addressed.
Azelaic Acid Counseling: Patient counseled that medicine may cause skin irritation and to avoid applying near the eyes.  In the event of skin irritation, the patient was advised to reduce the amount of the drug applied or use it less frequently.   The patient verbalized understanding of the proper use and possible adverse effects of azelaic acid.  All of the patient's questions and concerns were addressed.
Include Pregnancy/Lactation Warning?: No
Azithromycin Counseling:  I discussed with the patient the risks of azithromycin including but not limited to GI upset, allergic reaction, drug rash, diarrhea, and yeast infections.
Doxycycline Counseling:  Patient counseled regarding possible photosensitivity and increased risk for sunburn.  Patient instructed to avoid sunlight, if possible.  When exposed to sunlight, patients should wear protective clothing, sunglasses, and sunscreen.  The patient was instructed to call the office immediately if the following severe adverse effects occur:  hearing changes, easy bruising/bleeding, severe headache, or vision changes.  The patient verbalized understanding of the proper use and possible adverse effects of doxycycline.  All of the patient's questions and concerns were addressed.
Topical Sulfur Applications Pregnancy And Lactation Text: This medication is Pregnancy Category C and has an unknown safety profile during pregnancy. It is unknown if this topical medication is excreted in breast milk.
Topical Clindamycin Pregnancy And Lactation Text: This medication is Pregnancy Category B and is considered safe during pregnancy. It is unknown if it is excreted in breast milk.
Bactrim Counseling:  I discussed with the patient the risks of sulfa antibiotics including but not limited to GI upset, allergic reaction, drug rash, diarrhea, dizziness, photosensitivity, and yeast infections.  Rarely, more serious reactions can occur including but not limited to aplastic anemia, agranulocytosis, methemoglobinemia, blood dyscrasias, liver or kidney failure, lung infiltrates or desquamative/blistering drug rashes.
Winlevi Pregnancy And Lactation Text: This medication is considered safe during pregnancy and breastfeeding.
High Dose Vitamin A Counseling: Side effects reviewed, pt to contact office should one occur.
Erythromycin Counseling:  I discussed with the patient the risks of erythromycin including but not limited to GI upset, allergic reaction, drug rash, diarrhea, increase in liver enzymes, and yeast infections.
Topical Retinoid Pregnancy And Lactation Text: This medication is Pregnancy Category C. It is unknown if this medication is excreted in breast milk.
Benzoyl Peroxide Pregnancy And Lactation Text: This medication is Pregnancy Category C. It is unknown if benzoyl peroxide is excreted in breast milk.
Birth Control Pills Counseling: Birth Control Pill Counseling: I discussed with the patient the potential side effects of OCPs including but not limited to increased risk of stroke, heart attack, thrombophlebitis, deep venous thrombosis, hepatic adenomas, breast changes, GI upset, headaches, and depression.  The patient verbalized understanding of the proper use and possible adverse effects of OCPs. All of the patient's questions and concerns were addressed.
Isotretinoin Counseling: Patient should get monthly blood tests, not donate blood, not drive at night if vision affected, not share medication, and not undergo elective surgery for 6 months after tx completed. Side effects reviewed, pt to contact office should one occur.
Tazorac Pregnancy And Lactation Text: This medication is not safe during pregnancy. It is unknown if this medication is excreted in breast milk.
Detail Level: Zone
Dapsone Counseling: I discussed with the patient the risks of dapsone including but not limited to hemolytic anemia, agranulocytosis, rashes, methemoglobinemia, kidney failure, peripheral neuropathy, headaches, GI upset, and liver toxicity.  Patients who start dapsone require monitoring including baseline LFTs and weekly CBCs for the first month, then every month thereafter.  The patient verbalized understanding of the proper use and possible adverse effects of dapsone.  All of the patient's questions and concerns were addressed.
Aklief Pregnancy And Lactation Text: It is unknown if this medication is safe to use during pregnancy.  It is unknown if this medication is excreted in breast milk.  Breastfeeding women should use the topical cream on the smallest area of the skin for the shortest time needed while breastfeeding.  Do not apply to nipple and areola.
Spironolactone Pregnancy And Lactation Text: This medication can cause feminization of the male fetus and should be avoided during pregnancy. The active metabolite is also found in breast milk.
Minocycline Pregnancy And Lactation Text: This medication is Pregnancy Category D and not consider safe during pregnancy. It is also excreted in breast milk.
Azelaic Acid Pregnancy And Lactation Text: This medication is considered safe during pregnancy and breast feeding.
Isotretinoin Pregnancy And Lactation Text: This medication is Pregnancy Category X and is considered extremely dangerous during pregnancy. It is unknown if it is excreted in breast milk.
Medical Necessity Information: LCD Guidelines vary from payer to payer. Please check with your payer's policy to determine medical necessity.
Medical Necessity Clause: Botulinum toxin hyperhidrosis therapy is medically necessary because

## 2024-04-16 NOTE — PROCEDURE: PRESCRIPTION MEDICATION MANAGEMENT
Continue Regimen: Cabtreo qd
Render In Strict Bullet Format?: No
Detail Level: Zone
Continue Regimen: Ketoconazole shampoo & mometasone

## 2024-05-07 ENCOUNTER — APPOINTMENT (RX ONLY)
Dept: URBAN - METROPOLITAN AREA CLINIC 164 | Facility: CLINIC | Age: 24
Setting detail: DERMATOLOGY
End: 2024-05-07

## 2024-05-07 DIAGNOSIS — Z41.9 ENCOUNTER FOR PROCEDURE FOR PURPOSES OTHER THAN REMEDYING HEALTH STATE, UNSPECIFIED: ICD-10-CM

## 2024-05-07 PROCEDURE — ? PRODUCT LINE (ELTA MD)

## 2024-05-07 PROCEDURE — ? SIGNATURE HYDRAFACIAL

## 2024-05-07 PROCEDURE — ? ADDITIONAL NOTES

## 2024-05-07 NOTE — PROCEDURE: PRODUCT LINE (ELTA MD)
Product 43 Price (In Dollars - Numeric Only, No Special Characters Or $): 0.00
Product 4 Units: 0
Name Of Product 2: Elta MD UV Daily
Allow Plan To Count Towards E/M Coding: Yes
Name Of Product 11: AOX Mist SPF 40
Product 9 Price (In Dollars - Numeric Only, No Special Characters Or $): 32
Product 4 Application Directions: Apply AM 7x a week.
Name Of Product 7: UV Replenish
Product 3 Application Directions: Apply AM after moisturizer. Reapply if prolong in the sun every 2 hours.
Product 11 Price (In Dollars - Numeric Only, No Special Characters Or $): 45
Name Of Product 5: UV Clear (Tinted) SPF 46
Product 7 Price (In Dollars - Numeric Only, No Special Characters Or $): 40
Product 9 Application Directions: Apply to the face & neck every 1-2 hours.
Product 2 Price (In Dollars - Numeric Only, No Special Characters Or $): 36
Name Of Product 4: ELTA MD UV Sport
Product 7 Application Directions: Apply AM after cleansing. Reapply every 2 hours if prolong in the sun.
Product 11 Application Directions: Apply to body & rub in every 1-2 hours.
Product 5 Price (In Dollars - Numeric Only, No Special Characters Or $): 43
Name Of Product 10: UV Daily Tinted
Product 2 Application Directions: Apply AM 7x a week. Reapply every 2 hours if exposed to the sun.
Name Of Product 8: Elta MD UV Clear
Product 10 Price (In Dollars - Numeric Only, No Special Characters Or $): 38
Product 5 Application Directions: Apply AM after cleansing.
Name Of Product 1: UV AOX Elements
Name Of Product 3: UV Pure
Product 8 Price (In Dollars - Numeric Only, No Special Characters Or $): 41
Name Of Product 6: Foaming Facial Cleanser
Detail Level: Zone
Product 8 Units: 1
Product 8 Application Directions: Apply in the AM after moisturizing.
Product 4 Price (In Dollars - Numeric Only, No Special Characters Or $): 25.50
Assigning Risk Information: Per AMA, level of risk is based upon consequences of the problem(s) addressed at the encounter when appropriately treated. Risk also includes medical decision making related to the need to initiate or forego further testing, treatment and/or hospitalization. Over the counter medication are assigned a risk level of low. Prescription medication management is assigned a risk level of moderate.
Product 1 Application Directions: Apply AM 7x a week or when exposed to the sun.
Product 10 Application Directions: Apply in the AM after moisturizer.
Risk Of Complication Category: No MDM
Name Of Product 9: Elta MD UV Pure
Product 6 Application Directions: Cleanse AM & PM everyday.

## 2024-05-07 NOTE — PROCEDURE: ADDITIONAL NOTES
Detail Level: Zone
Additional Notes: Patient originally came in for a chemical peel but we ended up treating patient with a Hydrafacial Signature due to lifestyle reasons this week. Patient is scheduled for a chemical peel on Friday May 10th at 9am. I consulted with patient we may need to preform 1-2 peels before starting SkinPen/Nanofractional for corrective reasons.
Render Risk Assessment In Note?: no

## 2024-05-07 NOTE — PROCEDURE: SIGNATURE HYDRAFACIAL
Procedure: Extraction
Vacuum Pressure High Setting (Will Not Render If Set To 0): 0
Post-Care Instructions: I reviewed with the patient in detail post-care instructions. Patient should stay away from the sun and wear sun protection until treated areas are fully healed.
Solution Override
Solution: Activ-4
Price (Use Numbers Only, No Special Characters Or $): 199
Solution: Antiox-6
Solution: Beta-HD
Tip: Hydropeel Tip, Teal
Solution Override: Obagi SunShield Matter SPF 50
Procedure: Peel
Indication: acne
Treatment Number: 7
Tip Override
Solution Override: ZO Renewal Creme
Tip: Hydropeel Tip, Orange Aggression
Procedure: Extend and Protect
Solution: GlySal 30%
Procedure: Exfoliation
Location: face
Tip: Hydropeel Tip, Clear
Procedure: Fusion
Consent: Written consent obtained, risks reviewed including but not limited to crusting, scabbing, blistering, scarring, darker or lighter pigmentary change, bruising, and/or incomplete response.
Procedure: Boost

## 2024-05-10 ENCOUNTER — APPOINTMENT (RX ONLY)
Dept: URBAN - METROPOLITAN AREA CLINIC 164 | Facility: CLINIC | Age: 24
Setting detail: DERMATOLOGY
End: 2024-05-10

## 2024-05-10 DIAGNOSIS — Z41.9 ENCOUNTER FOR PROCEDURE FOR PURPOSES OTHER THAN REMEDYING HEALTH STATE, UNSPECIFIED: ICD-10-CM

## 2024-05-10 PROCEDURE — ? ADDITIONAL NOTES

## 2024-05-10 PROCEDURE — ? VI PEEL

## 2024-05-10 NOTE — PROCEDURE: VI PEEL
Post Peel Care: After the procedure, the patient was instructed not to wash the treated area for 6-8 hours or manually remove dead skin when the peeling process starts. Patient may use OTC hydrocortisone cream for itching.  Patient instructed to use the provided Retin-A wipes on the treated area on the 1st and 2nd nights.
Price (Use Numbers Only, No Special Characters Or $): 389
Prep: The treated area was degreased with pre-peel cleanser, and vaseline was applied for protection of mucous membranes.
Post-Care Instructions: I reviewed with the patient in detail post-care instructions. Patient should avoid sun exposure and wear sun protection.
Consent: Prior to the procedure, written consent was obtained and risks were reviewed, including but not limited to: redness, peeling, blistering, pigmentary change, scarring, infection, and pain. Patient is aware multiple treatments may be necessary to achieve the desired outcome.
Comments: *Purify w/ Precision Plus*
Detail Level: Zone
Chemical Peel: VI Peel Purify
Treatment Number: 3

## 2024-05-10 NOTE — PROCEDURE: ADDITIONAL NOTES
Detail Level: Zone
Additional Notes: Patient came in for a chemical peel. Today we performed a VI Peel (Purify w/ Precision Plus). Patient was pleased with treatment & is scheduled for a 4 week post on 6.7.24 at 9am. At the appointment we will discuss treating with another chemical peel or start the SkinPen treatments.
Render Risk Assessment In Note?: no

## 2024-06-10 ENCOUNTER — APPOINTMENT (RX ONLY)
Dept: URBAN - METROPOLITAN AREA CLINIC 164 | Facility: CLINIC | Age: 24
Setting detail: DERMATOLOGY
End: 2024-06-10

## 2024-06-10 DIAGNOSIS — Z41.9 ENCOUNTER FOR PROCEDURE FOR PURPOSES OTHER THAN REMEDYING HEALTH STATE, UNSPECIFIED: ICD-10-CM

## 2024-06-10 PROCEDURE — ? PRODUCT LINE (OBAGI)

## 2024-06-10 PROCEDURE — ? PRODUCT LINE (ZO SKIN HEALTH)

## 2024-06-10 PROCEDURE — ? ADDITIONAL NOTES

## 2024-06-10 NOTE — PROCEDURE: PRODUCT LINE (OBAGI)
Product 34 Application Directions: Apply 1x daily in the AM
Product 18 Price (In Dollars - Numeric Only, No Special Characters Or $): 92
Product 23 Price (In Dollars - Numeric Only, No Special Characters Or $): 85
Name Of Product 34: C-Clarifying Serum (normal to oily)
Product 1 Units: 0
Name Of Product 6: Nu-Cil Eyelash Serum
Name Of Product 11: ELASTIderm Neck & Decollete Concentrate
Product 42 Price (In Dollars - Numeric Only, No Special Characters Or $): 0.00
Product 20 Application Directions: Cleanse AM & PM, 7x a week.
Name Of Product 16: Professional-C Serum 10%
Product 1 Application Directions: Apply AM 7x a week.
Name Of Product 31: Hydrate Light
Name Of Product 26: Nu-Derm Foaming Cleanser
Product 13 Application Directions: Written instructions were handed to patient.
Name Of Product 35: Cleansing Wipes
Assigning Risk Information: Per AMA, level of risk is based upon consequences of the problem(s) addressed at the encounter when appropriately treated. Risk also includes medical decision making related to the need to initiate or forego further testing, treatment and/or hospitalization. Over the counter medication are assigned a risk level of low. Prescription medication management is assigned a risk level of moderate.
Product 6 Price (In Dollars - Numeric Only, No Special Characters Or $): 120
Product 8 Application Directions: Apply after cleansing in the AM. Reapply every two hours if prolong in the sun.
Product 11 Price (In Dollars - Numeric Only, No Special Characters Or $): 250
Product 34 Price (In Dollars - Numeric Only, No Special Characters Or $): 148
Name Of Product 21: CLENZIderm Pore Therapy
Product 3 Application Directions: Apply AM & PM 7x a week.
Product 16 Price (In Dollars - Numeric Only, No Special Characters Or $): 94
Name Of Product 4: Hydrate Luxe
Name Of Product 2: ELASTIderm Eye Serum
Product 21 Price (In Dollars - Numeric Only, No Special Characters Or $): 44
Product 18 Application Directions: Apply 2-3x a week only in the PM.
Risk Of Complication Category: No MDM
Product 23 Application Directions: Apply a pea size amount to finger & apply externally & bring in internally on the face every other night.
Product 31 Price (In Dollars - Numeric Only, No Special Characters Or $): 58
Name Of Product 14: ELASTIderm Facial Serum
Name Of Product 24: Nu-Cil Eyebrow Boosting Serum
Product 35 Price (In Dollars - Numeric Only, No Special Characters Or $): 25
Product 11 Application Directions: Apply AM & PM after cleansing.
Name Of Product 9: Obagi 360 Exfoliating Cleanser
Product 4 Price (In Dollars - Numeric Only, No Special Characters Or $): 75
Product 9 Price (In Dollars - Numeric Only, No Special Characters Or $): 42
Product 6 Application Directions: On cleansed skin apply to upper lash line nightly.
Product 32 Price (In Dollars - Numeric Only, No Special Characters Or $): 92.66
Allow Plan To Count Towards E/M Coding: Yes
Product 14 Price (In Dollars - Numeric Only, No Special Characters Or $): 198
Product 2 Price (In Dollars - Numeric Only, No Special Characters Or $): 107
Name Of Product 19: ELASTIderm Eye Cream
Name Of Product 12: Obagi SunShield SPF 50 Matte
Product 16 Application Directions: Apply in the AM before moisturizer.
Product 24 Price (In Dollars - Numeric Only, No Special Characters Or $): 145
Product 21 Application Directions: Apply as a toner after cleansing or exfoliating
Product 26 Application Directions: Cleanse AM & double cleanse PM
Product 29 Price (In Dollars - Numeric Only, No Special Characters Or $): 118
Product 31 Application Directions: Apply AM & PM after cleansing
Name Of Product 27: Obagi Professional-C Serum 10%
Name Of Product 32: Nu-Derm Clear
Name Of Product 7: Obagi Toner
Product 4 Units: 1
Product 12 Price (In Dollars - Numeric Only, No Special Characters Or $): 54
Name Of Product 17: SunShield Broad Spectrum SPF 50 Tint (Warm)
Name Of Product 22: Nu-Derm Gentle Cleanser
Product 9 Application Directions: Cleanse AM & PM 7x a week.
Product 4 Application Directions: Apply AM & PM
Product 32 Application Directions: Apply AM & PM to the dark areas.
Name Of Product 5: Hydrate Moisturizer
Name Of Product 15: Tretinoin 0.1%
Product 19 Application Directions: Apply to cleansed skin AM & PM.
Name Of Product 33: Vitamin-C Serum 20%
Product 24 Application Directions: Apply to cleansed skin every night.
Product 29 Application Directions: Apply to cleansed eye area am & pm daily
Product 7 Price (In Dollars - Numeric Only, No Special Characters Or $): 43
Name Of Product 30: Obagi Physical Defense SPF 40
Name Of Product 10: Obagi Rebalance
Product 5 Price (In Dollars - Numeric Only, No Special Characters Or $): 55
Name Of Product 20: CLENZIderm Daily Care Foaming Cleanser
Product 33 Price (In Dollars - Numeric Only, No Special Characters Or $): 139
Product 2 Application Directions: Apply eye serum AM and eye cream in the PM.
Name Of Product 25: Obagi C-Clarifying Serum
Product 15 Price (In Dollars - Numeric Only, No Special Characters Or $): 79
Product 30 Price (In Dollars - Numeric Only, No Special Characters Or $): 60
Name Of Product 13: Obagi Nu-Derm Normal to Dry kit
Product 27 Application Directions: Apply AM before moisturizer
Product 10 Price (In Dollars - Numeric Only, No Special Characters Or $): 110
Name Of Product 8: Obagi Sun Shield Tint SPF (Cool)
Name Of Product 3: Daily Hydro-Drops
Name Of Product 1: 15% Vitamin C Serum
Product 17 Application Directions: Apply in the AM & reapply if prolong in the sun.
Product 13 Price (In Dollars - Numeric Only, No Special Characters Or $): 500
Name Of Product 23: Tretinoin 0.25
Product 15 Application Directions: Apply PM after cleansing & serums before moisturizer.
Product 10 Application Directions: Apply AM & PM as a moisturizer.
Name Of Product 28: Obagi Cleansing Wipes
Product 28 Price (In Dollars - Numeric Only, No Special Characters Or $): 27
Product 25 Application Directions: Apply after cleansing.
Detail Level: Zone
Product 3 Price (In Dollars - Numeric Only, No Special Characters Or $): 98
Name Of Product 18: Tretinoin 0.5
Product 1 Price (In Dollars - Numeric Only, No Special Characters Or $): 115.50

## 2024-06-10 NOTE — PROCEDURE: ADDITIONAL NOTES
Detail Level: Zone
Render Risk Assessment In Note?: no
Additional Notes: Patient came in for a cosmetic post. We last preformed a VI Peel on 5.10.24. Photos were taken & progress was shown. Patient is very pleased with progression & stated he would have to pause on corrective treatments due to school finance’s. Patient stated he would keep coming in for Hydrafacials to maintain the acne. \\n\\nPatient also replenished ZO Gentle Cleanser, Exfoliating Polish & Obagi Hydrate Luxe.\\n\\nPatient stated he will call back to schedule future treatments.

## 2024-06-10 NOTE — PROCEDURE: PRODUCT LINE ([facility])
Render Product Pricing In Note: Yes
Product 33 Price (In Dollars - Numeric Only, No Special Characters Or $): 0.00
Product 8 Price (In Dollars - Numeric Only, No Special Characters Or $): 168
Name Of Product 31: Complexion Clarifying Serum
Name Of Product 25: Brightening Eye Creme
Name Of Product 11: Oil Control Pads
Name Of Product 3: Calming Toner
Product 5 Application Directions: Apply after cleansing in the AM & PM 7x a week.
Product 14 Price (In Dollars - Numeric Only, No Special Characters Or $): 80
Product 38 Units: 0
Product 22 Price (In Dollars - Numeric Only, No Special Characters Or $): 130
Product 17 Price (In Dollars - Numeric Only, No Special Characters Or $): 67
Product 3 Price (In Dollars - Numeric Only, No Special Characters Or $): 40
Name Of Product 20: Instant Pore Refiner
Product 27 Application Directions: Apply AM, 7x after cleansing.
Product 8 Application Directions: Apply AM & PM 7x a week.
Name Of Product 28: Illuminating AOX Serum
Product 22 Application Directions: Written instructions were handed to the patient.
Detail Level: Zone
Product 11 Price (In Dollars - Numeric Only, No Special Characters Or $): 64
Product 14 Application Directions: Scrub in the PM 3-5x a week.
Name Of Product 6: Hydrating Cleanser
Name Of Product 1: Gentle Cleanser
Product 6 Price (In Dollars - Numeric Only, No Special Characters Or $): 49
Product 20 Price (In Dollars - Numeric Only, No Special Characters Or $): 62
Product 25 Application Directions: Apply AM & PM on the lower lids only, after cleansing.
Name Of Product 9: Daily Power Defense
Product 3 Application Directions: Apply AM & PM 7x a week after cleansing or exfoliating (on days you exfoliate).
Product 28 Price (In Dollars - Numeric Only, No Special Characters Or $): 165
Name Of Product 15: Wrinkle + Texture Repair
Product 31 Price (In Dollars - Numeric Only, No Special Characters Or $): 110
Product 17 Application Directions: Apply AM & reapply if prolong in the sun, every 2 hours.
Name Of Product 23: Grown Factor Eye Serum
Name Of Product 18: ZO Skin Brightener 0.5 Retinol
Product 15 Price (In Dollars - Numeric Only, No Special Characters Or $): 159
Product 12 Application Directions: Cleanse & Apply to cystic flare up 1-3x a day.
Name Of Product 26: Hydrating Creme
Name Of Product 4: Renewal Creme
Name Of Product 12: Acne Control
Product 6 Application Directions: Use AM & PM 7x a week
Product 9 Price (In Dollars - Numeric Only, No Special Characters Or $): 173
Product 4 Price (In Dollars - Numeric Only, No Special Characters Or $): 120
Product 12 Price (In Dollars - Numeric Only, No Special Characters Or $): 36
Name Of Product 21: Retinol Skin Brightener 0.25
Product 18 Price (In Dollars - Numeric Only, No Special Characters Or $): 115
Product 1 Units: 1
Product 28 Application Directions: Apply AM before SPF.
Product 31 Application Directions: Apply am & pm on days you do not exfoliate.
Name Of Product 29: Balancing Emulsion Cleanser
Product 15 Application Directions: Apply 2x a week at night (0-2 weeks)\\nApply 3x a week at night (2-4 weeks)\\nApply 4x a week at night (4-6 weeks)
Name Of Product 32: Complexion Clearing Masque
Product 23 Application Directions: Apply to upper & lower lids after cleansing.
Product 1 Application Directions: Cleanse AM & PM 7x a week.
Name Of Product 13: Rozatrol
Name Of Product 7: Recovery Creme
Product 13 Price (In Dollars - Numeric Only, No Special Characters Or $): 91
Product 21 Price (In Dollars - Numeric Only, No Special Characters Or $): 100
Assigning Risk Information: Per AMA, level of risk is based upon consequences of the problem(s) addressed at the encounter when appropriately treated. Risk also includes medical decision making related to the need to initiate or forego further testing, treatment and/or hospitalization. Over the counter medication are assigned a risk level of low. Prescription medication management is assigned a risk level of moderate.
Product 26 Application Directions: Apply after cleaning and toning
Name Of Product 24: ZO BrightAlive Skin Brightener
Name Of Product 10: Exfoliating Cleanser
Product 29 Price (In Dollars - Numeric Only, No Special Characters Or $): 52
Name Of Product 2: Exfoliating Polish
Product 18 Application Directions: Apply 2x a week at night for week 0-2.\\nApply 3x a week at night for week 2-4.\\nApply 4x a week at night for week 4-6.
Risk Of Complication Category: No MDM
Product 32 Price (In Dollars - Numeric Only, No Special Characters Or $): 48
Product 7 Price (In Dollars - Numeric Only, No Special Characters Or $): 125
Name Of Product 16: Skin Brightening Program
Name Of Product 19: Exfoliating Polish (travel)
Product 2 Price (In Dollars - Numeric Only, No Special Characters Or $): 68
Product 16 Price (In Dollars - Numeric Only, No Special Characters Or $): 193
Product 7 Application Directions: Apply in the PM, include the neck. Can be applied in the AM if dry.
Name Of Product 27: Exfoliation Accelerator
Product 32 Application Directions: Apply masque to face after cleansing 2x a week.
Name Of Product 5: Complexion Renewal Pads
Product 13 Application Directions: Apply AM & PM before moisturizer.
Product 24 Price (In Dollars - Numeric Only, No Special Characters Or $): 145
Name Of Product 22: Retinol Skin Brightener 0.1 Retinol
Product 19 Price (In Dollars - Numeric Only, No Special Characters Or $): 21
Product 29 Application Directions: Cleanse am & pm daily
Name Of Product 30: BrightAlive
Name Of Product 8: Growth Factor Serum
Product 24 Application Directions: Apply AM & PM after cleansing & toner.
Product 2 Application Directions: Use AM or PM 2-3x a week.
Product 27 Price (In Dollars - Numeric Only, No Special Characters Or $): 72
Product 5 Price (In Dollars - Numeric Only, No Special Characters Or $): 59
Name Of Product 14: Dual Action Scrub
Product 16 Application Directions: Written directions were handed to patient.
Product 19 Application Directions: Exfoliate 2-3x a week.
Name Of Product 17: Sunscreen + Primer SPF 30
Product 30 Application Directions: Apply AM & PM after Daily Power Defense

## 2024-06-10 NOTE — HPI: COSMETIC FOLLOW UP
What Condition Are We Treating?: Acne scars
What Procedure Did We Perform At The Last Visit?: VI Peel (Purify w/ Precision Plus)

## 2024-07-09 ENCOUNTER — RX ONLY (OUTPATIENT)
Age: 24
Setting detail: RX ONLY
End: 2024-07-09

## 2024-07-09 ENCOUNTER — APPOINTMENT (RX ONLY)
Dept: URBAN - METROPOLITAN AREA CLINIC 164 | Facility: CLINIC | Age: 24
Setting detail: DERMATOLOGY
End: 2024-07-09

## 2024-07-09 DIAGNOSIS — L70.0 ACNE VULGARIS: ICD-10-CM | Status: IMPROVED

## 2024-07-09 DIAGNOSIS — D18.0 HEMANGIOMA: ICD-10-CM | Status: STABLE

## 2024-07-09 DIAGNOSIS — L74.51 PRIMARY FOCAL HYPERHIDROSIS: ICD-10-CM | Status: STABLE

## 2024-07-09 PROBLEM — L74.510 PRIMARY FOCAL HYPERHIDROSIS, AXILLA: Status: ACTIVE | Noted: 2024-07-09

## 2024-07-09 PROBLEM — D18.01 HEMANGIOMA OF SKIN AND SUBCUTANEOUS TISSUE: Status: ACTIVE | Noted: 2024-07-09

## 2024-07-09 PROCEDURE — ? BENIGN DESTRUCTION

## 2024-07-09 PROCEDURE — ? PRESCRIPTION MEDICATION MANAGEMENT

## 2024-07-09 PROCEDURE — ? COUNSELING

## 2024-07-09 PROCEDURE — 99214 OFFICE O/P EST MOD 30 MIN: CPT | Mod: 25

## 2024-07-09 PROCEDURE — 17110 DESTRUCTION B9 LES UP TO 14: CPT

## 2024-07-09 RX ORDER — CLINDAMYCIN PHOSPHATE/BENZOYL PEROXIDE/ADAPALENE 1.5; 31; 12 MG/G; MG/G; MG/G
GEL TOPICAL
Qty: 50 | Refills: 10 | Status: ERX

## 2024-07-09 ASSESSMENT — LOCATION DETAILED DESCRIPTION DERM
LOCATION DETAILED: LEFT INFERIOR CENTRAL MALAR CHEEK
LOCATION DETAILED: RIGHT AXILLARY VAULT
LOCATION DETAILED: LEFT AXILLARY VAULT
LOCATION DETAILED: LEFT DISTAL DORSAL FOREARM

## 2024-07-09 ASSESSMENT — LOCATION ZONE DERM
LOCATION ZONE: AXILLAE
LOCATION ZONE: FACE
LOCATION ZONE: ARM

## 2024-07-09 ASSESSMENT — LOCATION SIMPLE DESCRIPTION DERM
LOCATION SIMPLE: LEFT AXILLARY VAULT
LOCATION SIMPLE: LEFT CHEEK
LOCATION SIMPLE: RIGHT AXILLARY VAULT
LOCATION SIMPLE: LEFT FOREARM

## 2024-07-09 NOTE — PROCEDURE: BENIGN DESTRUCTION
Render Note In Bullet Format When Appropriate: No
Detail Level: Detailed
Medical Necessity Clause: This procedure was medically necessary because the lesions that were treated were:
Consent: The patient's consent was obtained including but not limited to risks of crusting, scabbing, blistering, scarring, darker or lighter pigmentary change, recurrence, incomplete removal and infection.
Anesthesia Volume In Cc: 2
Post-Care Instructions: I reviewed with the patient in detail post-care instructions. Patient is to wear sunprotection, and avoid picking at any of the treated lesions. Pt may apply Vaseline to crusted or scabbing areas.
Treatment Number (Will Not Render If 0): 0
Medical Necessity Information: It is in your best interest to select a reason for this procedure from the list below. All of these items fulfill various CMS LCD requirements except the new and changing color options.

## 2024-07-09 NOTE — PROCEDURE: PRESCRIPTION MEDICATION MANAGEMENT
Continue Regimen: [place] qd
Render In Strict Bullet Format?: No
Detail Level: Zone
Continue Regimen: Qbrexa